# Patient Record
Sex: FEMALE | Race: WHITE
[De-identification: names, ages, dates, MRNs, and addresses within clinical notes are randomized per-mention and may not be internally consistent; named-entity substitution may affect disease eponyms.]

---

## 2017-01-08 ENCOUNTER — HOSPITAL ENCOUNTER (OUTPATIENT)
Dept: HOSPITAL 44 - ED | Age: 67
Setting detail: OBSERVATION
LOS: 1 days | Discharge: HOME | End: 2017-01-09
Attending: FAMILY MEDICINE | Admitting: FAMILY MEDICINE
Payer: COMMERCIAL

## 2017-01-08 VITALS — BODY MASS INDEX: 40.8 KG/M2

## 2017-01-08 DIAGNOSIS — J11.1: Primary | ICD-10-CM

## 2017-01-08 DIAGNOSIS — E86.0: ICD-10-CM

## 2017-01-08 LAB
EGFR (AFRICAN): > 60
EGFR (NON-AFRICAN): > 60
MCH RBC QN AUTO: 31 PG (ref 28–34)

## 2017-01-08 PROCEDURE — 96374 THER/PROPH/DIAG INJ IV PUSH: CPT

## 2017-01-08 PROCEDURE — 93005 ELECTROCARDIOGRAM TRACING: CPT

## 2017-01-08 PROCEDURE — 99284 EMERGENCY DEPT VISIT MOD MDM: CPT

## 2017-01-08 PROCEDURE — 80053 COMPREHEN METABOLIC PANEL: CPT

## 2017-01-08 PROCEDURE — 36415 COLL VENOUS BLD VENIPUNCTURE: CPT

## 2017-01-08 PROCEDURE — S1016 NON-PVC INTRAVENOUS ADMINIST: HCPCS

## 2017-01-08 PROCEDURE — 96376 TX/PRO/DX INJ SAME DRUG ADON: CPT

## 2017-01-08 PROCEDURE — 71020: CPT

## 2017-01-08 PROCEDURE — 81002 URINALYSIS NONAUTO W/O SCOPE: CPT

## 2017-01-08 PROCEDURE — 87400 INFLUENZA A/B EACH AG IA: CPT

## 2017-01-08 PROCEDURE — 96361 HYDRATE IV INFUSION ADD-ON: CPT

## 2017-01-08 PROCEDURE — 85025 COMPLETE CBC W/AUTO DIFF WBC: CPT

## 2017-01-08 PROCEDURE — G0378 HOSPITAL OBSERVATION PER HR: HCPCS

## 2017-01-08 PROCEDURE — 87040 BLOOD CULTURE FOR BACTERIA: CPT

## 2017-01-08 PROCEDURE — 80048 BASIC METABOLIC PNL TOTAL CA: CPT

## 2017-01-08 RX ADMIN — CITALOPRAM SCH MG: 20 TABLET ORAL at 21:03

## 2017-01-08 RX ADMIN — ACETAMINOPHEN SCH MG: 325 TABLET, FILM COATED ORAL at 21:01

## 2017-01-08 RX ADMIN — AMITRIPTYLINE HYDROCHLORIDE SCH MG: 25 TABLET, FILM COATED ORAL at 21:03

## 2017-01-08 RX ADMIN — OSELTAMIVIR PHOSPHATE SCH MG: 75 CAPSULE ORAL at 21:03

## 2017-01-08 RX ADMIN — ACETAMINOPHEN SCH MG: 325 TABLET, FILM COATED ORAL at 18:37

## 2017-01-08 RX ADMIN — RETINOL, ERGOCALCIFEROL, .ALPHA.-TOCOPHEROL ACETATE, DL-, PHYTONADIONE, ASCORBIC ACID, NIACINAMIDE, RIBOFLAVIN 5-PHOSPHATE SODIUM, THIAMINE HYDROCHLORIDE, PYRIDOXINE HYDROCHLORIDE, DEXPANTHENOL, BIOTIN, FOLIC ACID, AND CYANOCOBALAMIN SCH MLS/HR: KIT at 13:55

## 2017-01-08 RX ADMIN — ALPRAZOLAM SCH MG: 0.5 TABLET ORAL at 21:03

## 2017-01-08 NOTE — ED PHYSICIAN DOCUMENTATION
GI Bleed





- HISTORIAN


Historian: patient, paramedics





- HPI


Chief Complaint: General Adult


Additional Information: 





n/e/d fever chills--w.no other family membersorks nh(flu).  pt has had flu and 

pneumovac


Onset: hours (0200)


Timing: sudden onset


Severity: moderate





- Associated Symptoms


Description of Stools: diarrhea


Abdominal Pain: cramping


Emesis Description: denies: blood, coffee grounds


Other Related Symptoms: nausea, vomiting





- ROS


CONST: other (exab of chronic lo back pain)


SKIN/LYMPH: denies: leg swelling, rash, swollen glands, ankle swelling


CVS/RESP: cough.  denies: shortness of breath


EYES/ENT: denies: problems with vision, sore throat





- PAST HX


Past History: other (ch lo back pain  htn   pacemaker-no mi)


Surgeries/Procedures: appendectomy, cholecystectomy, hysterectomy


Immunizations: influenza, pneumovax


Allergies/Adverse Reactions: 


 Allergies











Allergy/AdvReac Type Severity Reaction Status Date / Time


 


No Known Allergies Allergy   Unverified 01/08/17 11:52











Home Medications: 


 Ambulatory Orders











 Medication  Instructions  Recorded


 


Alprazolam [XANAX] 0.25 mg PO HS 01/08/17


 


Amitriptyline HCl 50 mg PO DAILY 01/08/17


 


Atenolol [Atenolol] 25 mg PO DAILY 01/08/17


 


Atorvastatin Calcium 20 mg PO DAILY 01/08/17


 


Citalopram Hydrobromide 20 mg PO DAILY 01/08/17





[Citalopram HBr]  


 


Glucosamine HCl/Chondr Venegas A Na [Pv 1 each PO DAILY 01/08/17





Glucosamine-Chondroit Cplt]  


 


Meloxicam [Mobic] 15 mg PO DAILY 01/08/17


 


Omeprazole [Omeprazole] 20 mg PO DAILY 01/08/17


 


Triamterene/Hydrochlorothiazid 1 each PO DAILY 01/08/17





[Dyazide 37.5-25 Capsule]  














- SOCIAL HX


Smoking History: non-smoker


Alcohol Use: none


Drug Use: none





- FAMILY HX


Family History: none





- VITAL SIGNS


Vital Signs: 


 Vital Signs











Temp Pulse Resp BP Pulse Ox


 


 99.3 F   105 H  20   128/78   94 


 


 01/08/17 11:20  01/08/17 11:42  01/08/17 11:20  01/08/17 11:20  01/08/17 11:42














- REVIEWED ASSESSMENTS


Nursing Assessment  Reviewed: Yes


Vitals Reviewed: Yes





ED Results Lab/Radiology





- Lab Results


Lab Results: 


 Lab Results











  01/08/17 01/08/17





  11:42 11:42


 


WBC    19.40 K/ul H K/ul





    (4.00-12.00) 


 


RBC    5.55 M/ul H M/ul





    (3.90-5.20) 


 


Hgb    17.2 g/dL H g/dL





    (12.0-16.0) 


 


Hct    52.5 % H %





    (34.5-46.5) 


 


MCV    94.7 fl fl





    (80.0-100.0) 


 


MCH    31.0 pg pg





    (28.0-34.0) 


 


MCHC    32.7 g/dL g/dL





    (30.0-36.0) 


 


RDW    12.3 % %





    (11.3-14.3) 


 


Plt Count    293 K/mm3 K/mm3





    (130-400) 


 


Sodium  142 mmol/L mmol/L  





   (136-145)  


 


Potassium  3.3 mmol/L L mmol/L  





   (3.5-5.0)  


 


Chloride  102 mmol/L mmol/L  





   ()  


 


Carbon Dioxide  28 mmol/L mmol/L  





   (20-32)  


 


BUN  28 mg/dL H mg/dL  





   (10-26)  


 


Creatinine  0.7 mg/dL mg/dL  





   (0.4-1.5)  


 


Estimated Creat Clear  210   





   


 


Est GFR ( Amer)  > 60   





  (60 - ) 


 


Est GFR (Non-Af Amer)  > 60   





  (60 - ) 


 


Glucose  105 mg/dL H mg/dL  





   (70-99)  


 


Calcium  10.0 mg/dL mg/dL  





   (8.5-10.5)  


 


Total Bilirubin  0.9 mg/dL mg/dL  





   (0.2-1.2)  


 


AST  30 U/L U/L  





   (0-41)  


 


ALT  27 U/L U/L  





   (0-45)  


 


Alkaline Phosphatase  140 U/L H U/L  





   ()  


 


Total Protein  8.2 g/dL g/dL  





   (6.0-8.5)  


 


Albumin  4.7 g/dL g/dL  





   (3.0-5.5)  














- Orders


Orders: 


 ED Orders











 Category Date Time Status


 


 Continuous EKG monitoring Q30M Care  01/08/17 11:42 Active


 


 Continuous Pulse Oximetry Q30M Care  01/08/17 11:42 Active


 


 Place Saline Lock/IV NOW Care  01/08/17 11:42 Active


 


 BLOOD CULTURE Stat Lab  01/08/17 12:07 Received


 


 CBC/PLATELET/DIFF Routine Lab  01/08/17 11:42 Completed


 


 CMP Routine Lab  01/08/17 11:42 Completed


 


 INFLUENZA A&B Stat Lab  01/08/17 11:42 Ordered


 


 Ondansetron HCl/Pf [Zofran 4 mg/2 ml] Med  01/08/17 12:04 Discontinued





 4 mg .ROUTE .STK-MED ONE   


 


 Ondansetron HCl/Pf [Zofran 4 mg/2 ml] Med  01/08/17 12:04 Discontinued





 4 mg IVP NOW ONE   


 


 EKG WITH COMPARISON Stat Ther  01/08/17 11:42 Ordered


 


 Transfer Routine Transfer  01/08/17 Ordered














Abdominal Pain Physical Exam





- Physical Exam


General Appearance: moderate distress


EENT: eye inspection normal


NECK: normal inspection, supple


RESPIRATORY: no resp distress, breath sounds normal


CVS: No: reg rate & rhythm (tachy-paced at 105--pt says set at 84bpm)


ABDOMEN: soft, non-tender


EXTREMITIES: non-tender, normal range of motion


NEURO: oriented X3, mood/affect nml (chilling w/temp 99)


Vital Signs: 


 Vital Signs











Temp Pulse Resp BP Pulse Ox


 


 99.3 F   105 H  20   128/78   94 


 


 01/08/17 11:20  01/08/17 11:42  01/08/17 11:20  01/08/17 11:20  01/08/17 11:42














Discharge


Clincal Impression: 


 Influenza A





Home Medications: 


Ambulatory Orders





Alprazolam [XANAX] 0.25 mg PO HS 01/08/17 


Amitriptyline HCl 50 mg PO DAILY 01/08/17 


Atenolol [Atenolol] 25 mg PO DAILY 01/08/17 


Atorvastatin Calcium 20 mg PO DAILY 01/08/17 


Citalopram Hydrobromide [Citalopram HBr] 20 mg PO DAILY 01/08/17 


Glucosamine HCl/Chondr Venegas A Na [Pv Glucosamine-Chondroit Cplt] 1 each PO DAILY 

01/08/17 


Meloxicam [Mobic] 15 mg PO DAILY 01/08/17 


Omeprazole [Omeprazole] 20 mg PO DAILY 01/08/17 


Triamterene/Hydrochlorothiazid [Dyazide 37.5-25 Capsule] 1 each PO DAILY 01/08/ 17 








Condition: Good


Disposition: 09 ADMITTED AS INPATIENT


Decision to Admit: [id number]


Decision Time: 12:36

## 2017-01-09 VITALS — SYSTOLIC BLOOD PRESSURE: 140 MMHG | DIASTOLIC BLOOD PRESSURE: 50 MMHG

## 2017-01-09 LAB
APPEARANCE UR: CLEAR
BASOPHILS NFR BLD: 0.2 % (ref 0–1.5)
COLOR,URINE: YELLOW
EGFR (AFRICAN): > 60
EGFR (NON-AFRICAN): > 60
EOSINOPHIL NFR BLD: 1.4 % (ref 0–6.8)
LYMPHOCYTES # BLD AUTO: 1.4 # K/UL (ref 0.6–4)
MCH RBC QN AUTO: 31.2 PG (ref 28–34)
MONOCYTES #: 0.5 # K/UL (ref 0–0.9)
MONOCYTES %: 6.5 % (ref 0–11)
NEUTROPHILS #: 5.6 # K/UL (ref 1.4–7.7)
PH UR STRIP: 5 [PH] (ref 5–8)
PH UR STRIP: 6.5 [PH] (ref 5–8)
RBC UR QL: NEGATIVE
UROBILINOGEN URINE: 0.2 EU (ref 0.2–1)
UROBILINOGEN URINE: 0.2 EU (ref 0.2–1)

## 2017-01-09 RX ADMIN — ACETAMINOPHEN SCH MG: 325 TABLET, FILM COATED ORAL at 08:56

## 2017-01-09 RX ADMIN — AMITRIPTYLINE HYDROCHLORIDE SCH MG: 25 TABLET, FILM COATED ORAL at 08:56

## 2017-01-09 RX ADMIN — ACETAMINOPHEN SCH MG: 325 TABLET, FILM COATED ORAL at 12:38

## 2017-01-09 RX ADMIN — RETINOL, ERGOCALCIFEROL, .ALPHA.-TOCOPHEROL ACETATE, DL-, PHYTONADIONE, ASCORBIC ACID, NIACINAMIDE, RIBOFLAVIN 5-PHOSPHATE SODIUM, THIAMINE HYDROCHLORIDE, PYRIDOXINE HYDROCHLORIDE, DEXPANTHENOL, BIOTIN, FOLIC ACID, AND CYANOCOBALAMIN SCH MLS/HR: KIT at 02:26

## 2017-01-09 RX ADMIN — ALPRAZOLAM SCH: 0.5 TABLET ORAL at 09:08

## 2017-01-09 RX ADMIN — RETINOL, ERGOCALCIFEROL, .ALPHA.-TOCOPHEROL ACETATE, DL-, PHYTONADIONE, ASCORBIC ACID, NIACINAMIDE, RIBOFLAVIN 5-PHOSPHATE SODIUM, THIAMINE HYDROCHLORIDE, PYRIDOXINE HYDROCHLORIDE, DEXPANTHENOL, BIOTIN, FOLIC ACID, AND CYANOCOBALAMIN SCH MLS/HR: KIT at 08:57

## 2017-01-09 RX ADMIN — ACETAMINOPHEN SCH MG: 325 TABLET, FILM COATED ORAL at 05:15

## 2017-01-09 RX ADMIN — OSELTAMIVIR PHOSPHATE SCH MG: 75 CAPSULE ORAL at 08:56

## 2017-01-09 RX ADMIN — CITALOPRAM SCH MG: 20 TABLET ORAL at 08:56

## 2017-01-09 RX ADMIN — ACETAMINOPHEN SCH MG: 325 TABLET, FILM COATED ORAL at 00:44

## 2017-01-09 NOTE — DIAGNOSTIC IMAGING REPORT
Report Submission Date: 2017 8:16:11 PM CST







Patient ~ Study  

 

Name: LIZETTE PEREZ ~ Date: 2017 8:03:06 PM CST

 

MRN: [id number] ~ Modality Type: CR

 

Gender: F ~ Description: CHEST

 

: 48 ~ Institution: [facility]

 

Physician: SY PRITCHARD DO  ~ ~ ~





~

Chest, PA and lateral 



History: Cough, fever 



Findings: Left subclavian dual lead transvenous pacemaker is present. There is 
no infiltrate, effusion or pneumothorax. Heart size, mediastinum and pulmonary 
vascularity are normal. 



Impression: No active disease.

~

Electronically signed on 2017 8:16:11 PM CST by:

Chang FERNANDO

## 2017-03-03 NOTE — DISCHARGE SUMMARY
Discharge Summary





- Discharge Sumary


History of Present Illness: 





dehydration hypokalemia from influenza A


Condition at Discharge: Stable


Home Medications: 


 Ambulatory Orders











 Medication  Instructions  Recorded


 


Alprazolam [XANAX] 0.25 mg PO HS 01/08/17


 


Amitriptyline HCl 50 mg PO DAILY 01/08/17


 


Atenolol [Atenolol] 25 mg PO DAILY 01/08/17


 


Atorvastatin Calcium 20 mg PO DAILY 01/08/17


 


Citalopram Hydrobromide 20 mg PO DAILY 01/08/17





[Citalopram HBr]  


 


Glucosamine HCl/Chondr Venegas A Na [Pv 1 each PO DAILY 01/08/17





Glucosamine-Chondroit Cplt]  


 


Meloxicam [Mobic] 15 mg PO DAILY 01/08/17


 


Omeprazole [Omeprazole] 20 mg PO DAILY 01/08/17


 


Triamterene/Hydrochlorothiazid 1 each PO DAILY 01/08/17





[Dyazide 37.5-25 Capsule]  


 


Potassium Chloride [Klor-Con 10] 20 meq PO BID #60 tablet.er 01/09/17











Consultations this Visit: None


Procedures this Visit: None


Allergies/Adverse Reactions: 


 Allergies











Allergy/AdvReac Type Severity Reaction Status Date / Time


 


No Known Allergies Allergy   Unverified 01/08/17 11:52











Discharge Summary: 





DISCHARGED TO HOME F/U W/PCP


Hospital Course: SATISFACTORY-IMPROVED

## 2017-08-28 ENCOUNTER — HOSPITAL ENCOUNTER (EMERGENCY)
Dept: HOSPITAL 44 - ED | Age: 67
Discharge: HOME | End: 2017-08-28
Payer: COMMERCIAL

## 2017-08-28 VITALS — DIASTOLIC BLOOD PRESSURE: 60 MMHG | SYSTOLIC BLOOD PRESSURE: 146 MMHG

## 2017-08-28 DIAGNOSIS — J02.0: Primary | ICD-10-CM

## 2017-08-28 DIAGNOSIS — E87.6: ICD-10-CM

## 2017-08-28 DIAGNOSIS — M62.838: ICD-10-CM

## 2017-08-28 LAB
BASOPHILS NFR BLD: 1 % (ref 0–1.5)
EGFR (AFRICAN): > 60
EGFR (NON-AFRICAN): > 60
EOSINOPHIL NFR BLD: 5.7 % (ref 0–6.8)
MCH RBC QN AUTO: 30.7 PG (ref 28–34)
MCV RBC AUTO: 87.9 FL (ref 80–100)
MONOCYTES %: 7.1 % (ref 0–11)
NEUTROPHILS #: 4.9 # K/UL (ref 1.4–7.7)

## 2017-08-28 PROCEDURE — 96372 THER/PROPH/DIAG INJ SC/IM: CPT

## 2017-08-28 PROCEDURE — 71020: CPT

## 2017-08-28 PROCEDURE — 36415 COLL VENOUS BLD VENIPUNCTURE: CPT

## 2017-08-28 PROCEDURE — 84484 ASSAY OF TROPONIN QUANT: CPT

## 2017-08-28 PROCEDURE — 93005 ELECTROCARDIOGRAM TRACING: CPT

## 2017-08-28 PROCEDURE — 80053 COMPREHEN METABOLIC PANEL: CPT

## 2017-08-28 PROCEDURE — S1016 NON-PVC INTRAVENOUS ADMINIST: HCPCS

## 2017-08-28 PROCEDURE — A9270 NON-COVERED ITEM OR SERVICE: HCPCS

## 2017-08-28 PROCEDURE — 85025 COMPLETE CBC W/AUTO DIFF WBC: CPT

## 2017-08-28 PROCEDURE — 99283 EMERGENCY DEPT VISIT LOW MDM: CPT

## 2017-08-28 PROCEDURE — 87880 STREP A ASSAY W/OPTIC: CPT

## 2017-08-28 PROCEDURE — 83880 ASSAY OF NATRIURETIC PEPTIDE: CPT

## 2017-08-28 RX ADMIN — ORPHENADRINE CITRATE ONE MG: 30 INJECTION, SOLUTION INTRAMUSCULAR; INTRAVENOUS at 12:04

## 2017-08-28 RX ADMIN — POTASSIUM CHLORIDE ONE MEQ: 1500 TABLET, EXTENDED RELEASE ORAL at 12:30

## 2017-08-28 RX ADMIN — KETOROLAC TROMETHAMINE ONE MG: 30 INJECTION, SOLUTION INTRAMUSCULAR at 12:04

## 2017-08-28 NOTE — ED PHYSICIAN DOCUMENTATION
General Adult





- HISTORIAN


Historian: patient





- HPI


Stated Complaint: neck pain


Chief Complaint: General Adult


Onset: hours


Timing: worse


Further Comments: yes (67 year old female patient presents with complaints of 

left and right lateral neck pain, sore throat, cough, "hard to get air in" and 

right scapula pain.  Patient is status post right rotator cuff repair on 8/4/ 2017.  Right arm immobilized.)





- ROS


CONST: recent illness (Right shoulder surgery 8/4/17)


EYES/ENT: denies: problems with vision, nasal drainage, nasal congestion


CVS/RESP: shortness of breath, cough.  denies: chest pain


GI/: none


MS/SKIN/LYMPH: none


NEURO/PSYCH: denies: headache, fainting, dizziness, tingling, numbness, 

difficulty walking, difficulty with speech, anxiety, depression, other





- PAST HX


Past History: hypertension


Other History: other (HLD, anxiety, GERD, RA)


Surgeries/Procedures: cholecystectomy, other (appendectomy, hysterectomy, 

pacemaker)


Allergies/Adverse Reactions: 


 Allergies











Allergy/AdvReac Type Severity Reaction Status Date / Time


 


No Known Allergies Allergy   Verified 08/28/17 12:11














Home Medications: 


 Ambulatory Orders











 Medication  Instructions  Recorded


 


Alprazolam [XANAX] 0.25 mg PO HS 01/08/17


 


Amitriptyline HCl 50 mg PO DAILY 01/08/17


 


Atenolol [Atenolol] 25 mg PO DAILY 01/08/17


 


Citalopram Hydrobromide 20 mg PO DAILY 01/08/17





[Citalopram HBr]  


 


Glucosamine HCl/Chondr Venegas A Na [Pv 1 each PO DAILY 01/08/17





Glucosamine-Chondroit Cplt]  


 


Meloxicam [Mobic] 15 mg PO DAILY 01/08/17


 


Omeprazole [Omeprazole] 20 mg PO DAILY 01/08/17


 


Triamterene/Hydrochlorothiazid 1 each PO DAILY 01/08/17





[Dyazide 37.5-25 Capsule]  


 


Alprazolam [Xanax] 0.5 mg PO DAILY 08/28/17


 


Amitriptyline HCl [Elavil] 50 mg PO DAILY 08/28/17


 


Atenolol [Tenormin] 25 mg PO DAILY 08/28/17














- SOCIAL HX


Smoking History: non-smoker





- FAMILY HX


Family History: No





- VITAL SIGNS


Vital Signs: 





 Vital Signs











Temp Pulse Resp BP Pulse Ox


 


 97.8 F   79   22   124/54   98 


 


 08/28/17 10:45  08/28/17 10:45  08/28/17 10:45  08/28/17 10:45  08/28/17 10:45














- REVIEWED ASSESSMENTS


Nursing Assessment  Reviewed: Yes


Vitals Reviewed: Yes





ED Results Lab/Radiology





- Lab Results


Lab Results: 





 Lab Results











  08/28/17 08/28/17 08/28/17





  11:15 11:15 11:15


 


WBC      8.20 K/ul K/ul





     (4.00-12.00) 


 


RBC      4.11 M/ul M/ul





     (3.90-5.20) 


 


Hgb      12.6 g/dL g/dL





     (12.0-16.0) 


 


Hct      36.1 % %





     (34.5-46.5) 


 


MCV      87.9 fl fl





     (80.0-100.0) 


 


MCH      30.7 pg pg





     (28.0-34.0) 


 


MCHC      34.9 g/dL g/dL





     (30.0-36.0) 


 


RDW      12.3 % %





     (11.3-14.3) 


 


Plt Count      442 K/mm3 H K/mm3





     (130-400) 


 


Neut % (Auto)      59.4 % %





     (39.0-79.0) 


 


Lymph % (Auto)      25.3 % %





     (16.0-50.0) 


 


Mono % (Auto)      7.1 % %





     (0.0-11.0) 


 


Eos % (Auto)      5.7 % %





     (0.0-6.8) 


 


Baso % (Auto)      1.0 





     (0.0-1.5) 


 


Neut # (Auto)      4.9 # k/uL # k/uL





     (1.4-7.7) 


 


Lymph # (Auto)      2.1 # k/uL # k/uL





     (0.6-4.0) 


 


Mono # (Auto)      0.6 # k/uL # k/uL





     (0.0-0.9) 


 


Eos # (Auto)      0.5 # k/uL # k/uL





     (0.0-0.6) 


 


Baso # (Auto)      0.1 # k/uL # k/uL





     (0.0-0.5) 


 


Reactive Lymphs %      1.5 % %





     (0.0-5.0) 


 


Reactive Lymphs #      0.1 # k/uL # k/uL





     (0.0-0.8) 


 


Sodium    139 mmol/L mmol/L  





    (136-145)  


 


Potassium    3.2 mmol/L L mmol/L  





    (3.5-5.0)  


 


Chloride    98 mmol/L mmol/L  





    ()  


 


Carbon Dioxide    32 mmol/L mmol/L  





    (20-32)  


 


BUN    16 mg/dL mg/dL  





    (10-26)  


 


Creatinine    0.7 mg/dL mg/dL  





    (0.4-1.5)  


 


Est GFR ( Amer)    > 60   





   (60 - ) 


 


Est GFR (Non-Af Amer)    > 60   





   (60 - ) 


 


Glucose    77 mg/dL mg/dL  





    (70-99)  


 


Calcium    9.5 mg/dL mg/dL  





    (8.5-10.5)  


 


Total Bilirubin    0.4 mg/dL mg/dL  





    (0.2-1.2)  


 


AST    25 U/L U/L  





    (0-41)  


 


ALT    21 U/L U/L  





    (0-45)  


 


Alkaline Phosphatase    134 U/L H U/L  





    ()  


 


Troponin I  < 0.03 ng/mL L ng/mL    





   (0.03-0.06)   


 


NT-Pro-B Natriuret Pep  323.5 pg/mL H pg/mL    





   (15.0-125.0)   


 


Total Protein    7.2 g/dL g/dL  





    (6.0-8.5)  


 


Albumin    4.2 g/dL g/dL  





    (3.0-5.5)  


 


Group A Strep Screen      





    














  08/28/17





  11:15


 


WBC  





  


 


RBC  





  


 


Hgb  





  


 


Hct  





  


 


MCV  





  


 


MCH  





  


 


MCHC  





  


 


RDW  





  


 


Plt Count  





  


 


Neut % (Auto)  





  


 


Lymph % (Auto)  





  


 


Mono % (Auto)  





  


 


Eos % (Auto)  





  


 


Baso % (Auto)  





  


 


Neut # (Auto)  





  


 


Lymph # (Auto)  





  


 


Mono # (Auto)  





  


 


Eos # (Auto)  





  


 


Baso # (Auto)  





  


 


Reactive Lymphs %  





  


 


Reactive Lymphs #  





  


 


Sodium  





  


 


Potassium  





  


 


Chloride  





  


 


Carbon Dioxide  





  


 


BUN  





  


 


Creatinine  





  


 


Est GFR ( Amer)  





  


 


Est GFR (Non-Af Amer)  





  


 


Glucose  





  


 


Calcium  





  


 


Total Bilirubin  





  


 


AST  





  


 


ALT  





  


 


Alkaline Phosphatase  





  


 


Troponin I  





  


 


NT-Pro-B Natriuret Pep  





  


 


Total Protein  





  


 


Albumin  





  


 


Group A Strep Screen  Positive  H 





   (NEGATIVE) 














- Orders


Orders: 





 ED Orders











 Category Date Time Status


 


 Place IV Lock 1T Care  08/28/17 11:06 Inactive


 


 CHEST P.A.&LAT 2 VIEWS [RAD] Stat Exams  08/28/17 Ordered


 


 BNP [NT-proBNP] Stat Lab  08/28/17 11:15 Completed


 


 CBC/PLATELET/DIFF Stat Lab  08/28/17 11:15 Completed


 


 CMP Stat Lab  08/28/17 11:15 Completed


 


 GRP A STREP SCREEN Stat Lab  08/28/17 11:15 Completed


 


 TROPONIN I (cTnI) Stat Lab  08/28/17 11:15 Completed


 


 Ketorolac Tromethamine [Toradol] Med  08/28/17 10:58 Discontinued





 60 mg IM NOW ONE   


 


 Orphenadrine Citrate [Norflex] Med  08/28/17 11:22 Discontinued





 60 mg IM NOW ONE   


 


 Penicillin G Benzathine [Bicillin l-A] Med  08/28/17 12:17 Once





 1,200,000 units IM NOW ONE   


 


 Potassium Chloride [Klor-Con M20] Med  08/28/17 12:09 Discontinued





 20 meq PO NOW ONE   


 


 EKG WITH COMPARISON Stat Ther  08/28/17 11:07 Ordered














General Adult Physical Exam





- PHYSICAL EXAM


GENERAL APPEARANCE: anxious


EENT: eye inspection normal, ENT inspection normal, pharynx normal, no signs of 

dehydration, GLORIA, no nystagmus, TM's nml


NECK: normal inspection, other (left and right lateral neck tenderness with 

palpation)


RESPIRATORY: no resp distress, chest non-tender, breath sounds normal


CVS: reg rate & rhythm, heart sounds normal, equal pulses, no murmur, no gallop

, PMI nml, no JVD, no friction rub, 24


ABDOMEN: soft, no organomegaly, normal bowel sounds, no abdominal bruit, no 

distension


BACK: normal inspection, no CVA tenderness


SKIN: normal color, warm/dry, NR, INT, PAL, DR


EXTREMITIES: non-tender, normal range of motion, no evidence of injury, no edema

, other (right arm immobilzer in place.)


NEURO: oriented X3, CN's nml as tested, motor nml, sensation nml, mood/affect 

nml





Discharge


Clincal Impression: 


 Strep pharyngitis, Cervical paraspinal muscle spasm, Hypokalemia due to loss 

of potassium, History of repair of right rotator cuff





Referrals: 


Primary Doctor,No [Primary Care Provider] - 2 Days


Additional Instructions: 


Diagnosis #1:


Chloraseptic spray or lozenges as needed for throat pain.  


Warm salt water gargles as needed pain 


Increase your fluid intake  juices, hot tea, non-caffeinated beverages


If you are congested - You may want to try Vicks rub on your chest and/or feet


Use a humidifier in the room where you sleep.  You can also sit in a steam 

filled bathroom 1-2 times a day.  


Tylenol or Ibuprofen as needed for fever, pain and body aches.





Diagnosis #2:


Muscle spasms


Ice


Rest


Muscle relaxants as needed for spasm


Ibuprofen 600-800mg three times a day with food as needed.  Do not take for 

more than 5 days in a row. 


You may want to try massage, over the counter lidocaine patches, biofreeze, mahesh 

gillespie or aspercream .


Home Medications: 


Ambulatory Orders





Alprazolam [XANAX] 0.25 mg PO HS 01/08/17 


Amitriptyline HCl 50 mg PO DAILY 01/08/17 


Atenolol [Atenolol] 25 mg PO DAILY 01/08/17 


Citalopram Hydrobromide [Citalopram HBr] 20 mg PO DAILY 01/08/17 


Glucosamine HCl/Chondr Venegas A Na [Pv Glucosamine-Chondroit Cplt] 1 each PO DAILY 

01/08/17 


Meloxicam [Mobic] 15 mg PO DAILY 01/08/17 


Omeprazole [Omeprazole] 20 mg PO DAILY 01/08/17 


Triamterene/Hydrochlorothiazid [Dyazide 37.5-25 Capsule] 1 each PO DAILY 01/08/ 17 


Alprazolam [Xanax] 0.5 mg PO DAILY 08/28/17 


Amitriptyline HCl [Elavil] 50 mg PO DAILY 08/28/17 


Atenolol [Tenormin] 25 mg PO DAILY 08/28/17 








Condition: Stable


Disposition: 01 HOME, SELF-CARE


Decision to Admit: NO


Decision Time: 12:24

## 2017-08-28 NOTE — DIAGNOSTIC IMAGING REPORT
CONCHITA LEGER ([facility]) - ER 

[facility]

[address] [address] [address]

 

 

 

 

Report Submission Date: Aug 28, 2017 11:42:16 AM CDT

Patient       Study

Name:   LIZETTE PEREZ       Date:   Aug 28, 2017 11:10:42 AM CDT

MRN:   [id number]       Modality Type:   CR

Gender:   F       Description:   CHEST

:   48       Institution:   [facility]

Physician:   CONCHITA LEGER ([facility]) - ER

         

 

 

Chest 2 views 



History: Dyspnea 



Findings: Obesity, low lung volumes, cardiomegaly, dual lead transvenous 
pacemaker, and old granulomatous disease are observed. Minimal bibasilar 
reticular opacity is present. There is no pleural effusion. Findings have not 
change since the 2017 exam. 



Findings: 



1. Bibasilar atelectasis, edema, or bronchitis without change. 



2. Obesity, low lung volumes, cardiomegaly, and pacemaker without change.

 

Electronically signed on Aug 28, 2017 11:42:16 AM CDT by:

Saleem FERNANDO

## 2017-08-31 ENCOUNTER — HOSPITAL ENCOUNTER (EMERGENCY)
Dept: HOSPITAL 44 - ED | Age: 67
Discharge: HOME | End: 2017-08-31
Payer: COMMERCIAL

## 2017-08-31 VITALS — SYSTOLIC BLOOD PRESSURE: 115 MMHG | DIASTOLIC BLOOD PRESSURE: 68 MMHG

## 2017-08-31 DIAGNOSIS — J02.9: ICD-10-CM

## 2017-08-31 DIAGNOSIS — F41.9: Primary | ICD-10-CM

## 2017-08-31 LAB
BASOPHILS NFR BLD: 0.6 % (ref 0–1.5)
EGFR (AFRICAN): > 60
EGFR (NON-AFRICAN): > 60
EOSINOPHIL NFR BLD: 4 % (ref 0–6.8)
MCH RBC QN AUTO: 30.7 PG (ref 28–34)
MCV RBC AUTO: 89.6 FL (ref 80–100)
MONOCYTES %: 7.9 % (ref 0–11)
NEUTROPHILS #: 5.6 # K/UL (ref 1.4–7.7)

## 2017-08-31 PROCEDURE — 99283 EMERGENCY DEPT VISIT LOW MDM: CPT

## 2017-08-31 PROCEDURE — 96374 THER/PROPH/DIAG INJ IV PUSH: CPT

## 2017-08-31 PROCEDURE — 84484 ASSAY OF TROPONIN QUANT: CPT

## 2017-08-31 PROCEDURE — 85025 COMPLETE CBC W/AUTO DIFF WBC: CPT

## 2017-08-31 PROCEDURE — 82553 CREATINE MB FRACTION: CPT

## 2017-08-31 PROCEDURE — 82550 ASSAY OF CK (CPK): CPT

## 2017-08-31 PROCEDURE — 80053 COMPREHEN METABOLIC PANEL: CPT

## 2017-08-31 PROCEDURE — 71010: CPT

## 2017-08-31 PROCEDURE — S1016 NON-PVC INTRAVENOUS ADMINIST: HCPCS

## 2017-08-31 NOTE — DIAGNOSTIC IMAGING REPORT
MUKUL ATYLOR 

[facility]

[address] [facility] [address] [address]

 

 

 

 

Report Submission Date: Aug 31, 2017 7:14:54 PM CDT

Patient       Study

Name:   LIZETTE PEREZ       Date:   Aug 31, 2017 6:57:49 PM CDT

MRN:   [id number]       Modality Type:   CR

Gender:   F       Description:   CHEST

:   48       Institution:   [facility]

Physician:   MUKUL TAYLOR

         

 

 

Examination: Portable chest 



History: Chest discomfort 



Comparison exam: 2017 



Findings: Single view of the chest demonstrates a decreased inspiratory effort. 
Cardiac silhouette not enlarged. Tortuosity of the thoracic aorta. Left-sided 
cardiac pacemaker. Lung fields without focal infiltrate. No effusion. Osseous 
structures are appropriate for age. 



Impression: Poor inspiratory effort. No acute pulmonary process. No effusion.

 

Electronically signed on Aug 31, 2017 7:14:54 PM CDT by:

Sid FERNANDO

## 2017-08-31 NOTE — ED PHYSICIAN DOCUMENTATION
General Adult





- HISTORIAN


Historian: patient





- HPI


Stated Complaint: sob, neck pain, sore throat


Chief Complaint: General Adult


Onset: days ago (4)


Timing: still present


Severity: moderate


Further Comments: yes (Pt is a 68 yo female seen here 8/28/17 with similar 

presentation.  Pt c/o sob, neck pain, and sore throat.  Pt was dx'd on 8/28 

with strep pharyngitis, cervical paraspinal muscle spasm, and hypokalemia.  Pt 

had a rotator cuff repair on 8/4/17 and wears a shoulder immobilizer.  Pt c/o 

sob, "hard to get air in" and neck pain.  Pt states that she is very dry and 

has a raw sore throat.  Pt was given Penicillin IM in the ER on 8/28, but says 

that she was not sent home with oral abx.  Pt has been wheezy.)





- ROS


CONST: weakness


EYES/ENT: sore throat, other (dry mouth)


CVS/RESP: shortness of breath, other (wheezing)


GI/: none


MS/SKIN/LYMPH: none





- PAST HX


Past History: hypertension, other (HLD, anxiety, GERD, RA)


Surgeries/Procedures: cholecystectomy, other (appendectomy, hysterectomy, 

pacemaker.)





- SOCIAL HX


Smoking History: non-smoker





- FAMILY HX


Family History: No





- VITAL SIGNS


Vital Signs: 





 Vital Signs











Temp Pulse Resp BP Pulse Ox


 


          146/60    


 


          08/28/17 12:44   














- REVIEWED ASSESSMENTS


Nursing Assessment  Reviewed: Yes


Vitals Reviewed: Yes





<Hany Salazar - Last Filed: 08/31/17 19:35>





- VITAL SIGNS


Vital Signs: 





 Vital Signs











Temp Pulse Resp BP Pulse Ox


 


 98.0 F   77   16   140/55   99 


 


 08/31/17 18:30  08/31/17 18:30  08/31/17 18:30  08/31/17 18:30  08/31/17 18:30














<SHARLA DOSHI - Last Filed: 08/31/17 20:12>





- PAST HX


Allergies/Adverse Reactions: 


 Allergies











Allergy/AdvReac Type Severity Reaction Status Date / Time


 


No Known Allergies Allergy   Verified 08/31/17 18:40














Home Medications: 


 Ambulatory Orders











 Medication  Instructions  Recorded


 


Alprazolam [XANAX] 0.25 mg PO HS 01/08/17


 


Citalopram Hydrobromide 20 mg PO DAILY 01/08/17





[Citalopram HBr]  


 


Glucosamine HCl/Chondr Venegas A Na [Pv 1 each PO DAILY 01/08/17





Glucosamine-Chondroit Cplt]  


 


Meloxicam [Mobic] 15 mg PO DAILY 01/08/17


 


Omeprazole [Omeprazole] 20 mg PO DAILY 01/08/17


 


Triamterene/Hydrochlorothiazid 1 each PO DAILY 01/08/17





[Dyazide 37.5-25 Capsule]  


 


Alprazolam [Xanax] 0.5 mg PO DAILY 08/28/17


 


Amitriptyline HCl [Elavil] 50 mg PO DAILY 08/28/17


 


Atenolol [Tenormin] 25 mg PO DAILY 08/28/17














Progress





- Progress


[facility] 





[facility]





Care transferred to Sharla Doshi at 1900.





- EKG/XRAY/CT


EKG: rhythm (paced rhythm HR=82.)





<Hany Salazar - Last Filed: 08/31/17 19:35>





- Progress


Progress: 














Examination: Portable chest 





History: Chest discomfort 





Comparison exam: 28 August 2017 





Findings: Single view of the chest demonstrates a decreased inspiratory effort. 

Cardiac silhouette not enlarged. Tortuosity of the thoracic aorta. Left-sided 

cardiac pacemaker. Lung fields without focal infiltrate. No effusion. Osseous 

structures are appropriate for age. 





Impression: Poor inspiratory effort. No acute pulmonary process. No effusion.





 








Electronically signed on Aug 31, 2017 7:14:54 PM CDT by:





Sid Lux





<SHARLA DOSHI - Last Filed: 08/31/17 20:12>





ED Results Lab/Radiology





- Lab Results


Lab Results: 





 Lab Results











  08/31/17 08/31/17





  19:05 19:05


 


WBC    9.20 K/ul K/ul





    (4.00-12.00) 


 


RBC    4.29 M/ul M/ul





    (3.90-5.20) 


 


Hgb    13.2 g/dL g/dL





    (12.0-16.0) 


 


Hct    38.5 % %





    (34.5-46.5) 


 


MCV    89.6 fl fl





    (80.0-100.0) 


 


MCH    30.7 pg pg





    (28.0-34.0) 


 


MCHC    34.2 g/dL g/dL





    (30.0-36.0) 


 


RDW    12.2 % %





    (11.3-14.3) 


 


Plt Count    404 K/mm3 H K/mm3





    (130-400) 


 


Neut % (Auto)    61.1 % %





    (39.0-79.0) 


 


Lymph % (Auto)    24.7 % %





    (16.0-50.0) 


 


Mono % (Auto)    7.9 % %





    (0.0-11.0) 


 


Eos % (Auto)    4.0 % %





    (0.0-6.8) 


 


Baso % (Auto)    0.6 





    (0.0-1.5) 


 


Neut # (Auto)    5.6 # k/uL # k/uL





    (1.4-7.7) 


 


Lymph # (Auto)    2.3 # k/uL # k/uL





    (0.6-4.0) 


 


Mono # (Auto)    0.7 # k/uL # k/uL





    (0.0-0.9) 


 


Eos # (Auto)    0.4 # k/uL # k/uL





    (0.0-0.6) 


 


Baso # (Auto)    0.0 # k/uL # k/uL





    (0.0-0.5) 


 


Reactive Lymphs %    1.7 % %





    (0.0-5.0) 


 


Reactive Lymphs #    0.2 # k/uL # k/uL





    (0.0-0.8) 


 


CK-MB (CK-2)  0.9 ng/mL ng/mL  





   (0.0-5.6)  


 


Troponin I  < 0.03 ng/mL L ng/mL  





   (0.03-0.06)  














- Orders


Orders: 





 ED Orders











 Category Date Time Status


 


 CHEST 1 VIEW [RAD] Stat Exams  08/31/17 18:53 Taken


 


 CBC/PLATELET/DIFF Routine Lab  08/31/17 19:05 Completed


 


 CKMB Stat Lab  08/31/17 19:05 Completed


 


 CMP Routine Lab  08/31/17 19:05 Received


 


 CREATINE KINASE Routine Lab  08/31/17 19:05 Received


 


 TROPONIN I (cTnI) Stat Lab  08/31/17 19:05 Completed


 


 Diazepam [Valium] Med  08/31/17 18:49 Discontinued





 5 mg IVP NOW ONE   


 


 Ipratropium/Albuterol Sulfate [Duoneb] Med  08/31/17 18:48 Discontinued





 3 ml NEB .STK-MED ONE   


 


 Ipratropium/Albuterol Sulfate [Duoneb] Med  08/31/17 18:52 Discontinued





 3 ml NEB NOW ONE   


 


 Oxygen Daily Oxygen  08/31/17 19:00 Ordered


 


 EKG WITH COMPARISON Stat Ther  08/31/17 18:53 Ordered














<SHARLA DOSHI - Last Filed: 08/31/17 20:12>





General Adult Physical Exam





- PHYSICAL EXAM


GENERAL APPEARANCE: moderate distress (anxious)


EENT: pharyngeal erythema, dry mucous membranes


NECK: normal inspection, supple, other (b/l neck tenderness to palpation)


RESPIRATORY: wheezes


CVS: reg rate & rhythm, heart sounds normal


ABDOMEN: soft, no organomegaly, normal bowel sounds


BACK: normal inspection, no CVA tenderness


SKIN: warm/dry, normal color


EXTREMITIES: non-tender, normal range of motion


NEURO: oriented X3, motor nml, sensation nml, other (anxious)





<Hany Salazar - Last Filed: 08/31/17 19:35>





Discharge





<Hany Salazar - Last Filed: 08/31/17 19:35>


Decision to Admit: NO


Decision Time: 20:09





<SHARLA DOSHI - Last Filed: 08/31/17 20:12>


Clincal Impression: 


 Anxiety, Sore throat





Referrals: 


Primary Doctor,No [Primary Care Provider] - 2 Days


Additional Instructions: 


Follow up with your provider as needed. 


Home Medications: 


Ambulatory Orders





Alprazolam [XANAX] 0.25 mg PO HS 01/08/17 


Citalopram Hydrobromide [Citalopram HBr] 20 mg PO DAILY 01/08/17 


Glucosamine HCl/Chondr Venegas A Na [Pv Glucosamine-Chondroit Cplt] 1 each PO DAILY 

01/08/17 


Meloxicam [Mobic] 15 mg PO DAILY 01/08/17 


Omeprazole [Omeprazole] 20 mg PO DAILY 01/08/17 


Triamterene/Hydrochlorothiazid [Dyazide 37.5-25 Capsule] 1 each PO DAILY 01/08/ 17 


Alprazolam [Xanax] 0.5 mg PO DAILY 08/28/17 


Amitriptyline HCl [Elavil] 50 mg PO DAILY 08/28/17 


Atenolol [Tenormin] 25 mg PO DAILY 08/28/17 








Condition: Good


Disposition: 01 HOME, SELF-CARE

## 2018-10-25 ENCOUNTER — HOSPITAL ENCOUNTER (EMERGENCY)
Dept: HOSPITAL 44 - ED | Age: 68
Discharge: HOME | End: 2018-10-25
Payer: COMMERCIAL

## 2018-10-25 VITALS — SYSTOLIC BLOOD PRESSURE: 132 MMHG | DIASTOLIC BLOOD PRESSURE: 52 MMHG

## 2018-10-25 DIAGNOSIS — Z95.0: ICD-10-CM

## 2018-10-25 DIAGNOSIS — I51.7: ICD-10-CM

## 2018-10-25 DIAGNOSIS — J40: Primary | ICD-10-CM

## 2018-10-25 LAB
APPEARANCE UR: CLEAR
BASOPHILS NFR BLD: 0.4 % (ref 0–1.5)
COLOR,URINE: YELLOW
EGFR (NON-AFRICAN): > 60
EOSINOPHIL NFR BLD: 7.5 % (ref 0–6.8)
MCH RBC QN AUTO: 30.5 PG (ref 28–34)
MCV RBC AUTO: 92 FL (ref 80–100)
MONOCYTES %: 10.4 % (ref 0–11)
NEUTROPHILS #: 6 # K/UL (ref 1.4–7.7)
PH UR STRIP: 5.5 [PH] (ref 5–8)
RBC UR QL: NEGATIVE
UROBILINOGEN URINE: 1 EU (ref 0.2–1)

## 2018-10-25 PROCEDURE — S1016 NON-PVC INTRAVENOUS ADMINIST: HCPCS

## 2018-10-25 PROCEDURE — 87040 BLOOD CULTURE FOR BACTERIA: CPT

## 2018-10-25 PROCEDURE — 80053 COMPREHEN METABOLIC PANEL: CPT

## 2018-10-25 PROCEDURE — 87400 INFLUENZA A/B EACH AG IA: CPT

## 2018-10-25 PROCEDURE — 71046 X-RAY EXAM CHEST 2 VIEWS: CPT

## 2018-10-25 PROCEDURE — 85025 COMPLETE CBC W/AUTO DIFF WBC: CPT

## 2018-10-25 PROCEDURE — 81002 URINALYSIS NONAUTO W/O SCOPE: CPT

## 2018-10-25 PROCEDURE — 99284 EMERGENCY DEPT VISIT MOD MDM: CPT

## 2018-10-25 PROCEDURE — 87086 URINE CULTURE/COLONY COUNT: CPT

## 2018-10-25 RX ADMIN — AZITHROMYCIN ONE MG: 250 TABLET, FILM COATED ORAL at 01:24

## 2018-10-25 RX ADMIN — GUAIFENESIN AND CODEINE PHOSPHATE ONE ML: 10; 100 LIQUID ORAL at 01:25

## 2018-10-25 NOTE — DIAGNOSTIC IMAGING REPORT
BANG DOSHI 

[facility]

[address] [facility].[address] [address]

 

 

 

 

Report Submission Date: Oct 25, 2018 1:18:12 AM CDT

Patient       Study

Name:   LIZETTE PEREZ       Date:   Oct 25, 2018 12:56:34 AM CDT

MRN:   [id number]       Modality Type:   DX

Gender:   F       Description:   CHEST

:   48       Institution:   [facility]

Physician:   BANG DOSHI

     Accession:    [id number]

 

 

PA and lateral chest 

Clinical history:  Recent pneumonia.  Productive cough.  Shortness of breath. 

Findings:  Examination of the chest in PA and lateral views with no prior films 
for comparison demonstrates cardiomegaly and aortic atherosclerosis.  Bipolar 
pacemaker overlies left hemithorax.  Monitor leads superimpose the chest. 

Impression: 

1. Cardiomegaly and aortic atherosclerosis. 

2. Transvenous pacemaker.

 

Electronically signed on Oct 25, 2018 1:18:12 AM CDT by:

Erick FERNANDO

## 2018-10-25 NOTE — ED PHYSICIAN DOCUMENTATION
General Adult





- HISTORIAN


Historian: patient





- HPI


Stated Complaint: Cough/Body Aches


Chief Complaint: General Adult


Additional Information: 





Cough worse since 10/19. Also has body and joint aches, red bumps on lower legs 

for 2 days, feverish, night sweats x 2 weeks. Hospitalized at [facility] 10/4 with 

pneumonia and stayed for days. Her primary then put her on 10 day course of 

Levaquin and tapering prednisone both of which were completed 10/18. She has 

been coughing since, but worse the last six days. Has yeast infection under 

right breast and pannus. Has pacemaker 2/2 heart block. Flu shot last year. 





- ROS


CONST: fever, sweating





- PAST HX


Past History: other (heart block)


Surgeries/Procedures: cholecystectomy, other (defibrillator)


Allergies/Adverse Reactions: 


                                    Allergies











Allergy/AdvReac Type Severity Reaction Status Date / Time


 


No Known Allergies Allergy   Verified 10/25/18 00:25














Home Medications: 


                                Ambulatory Orders











 Medication  Instructions  Recorded


 


Citalopram Hydrobromide 20 mg PO DAILY 17





[Citalopram HBr]  


 


Glucosamine/Chondr Venegas A Sod [Pv 1 each PO BID 17





Glucosamine-Chondroit Cplt]  


 


Meloxicam [Mobic] 15 mg PO DAILY 17


 


Omeprazole 20 mg PO DAILY 17


 


Triamterene/Hydrochlorothiazid 1 each PO DAILY 17





[Dyazide 37.5-25 Capsule]  


 


Alprazolam [Xanax] 0.5 mg PO HS 17


 


Amitriptyline HCl [Elavil] 50 mg PO HS 17


 


Azithromycin [Zithromax] 250 mg PO DAILY #4 tablet 10/25/18


 


Codeine Phosphate/Guaifenesin 10 ml PO Q6H PRN #120 ml 10/25/18





[Robitussin AC]  


 


Metoprolol Succinate [Toprol Xl] 25 mg PO DAILY 10/25/18














- SOCIAL HX


Smoking History: non-smoker





- FAMILY HX


Family History: No





- VITAL SIGNS


Vital Signs: 





                                   Vital Signs











Temp Pulse Resp BP Pulse Ox


 


 98.9 F   85   24   125/56   95 


 


 10/25/18 00:05  10/25/18 00:05  10/25/18 00:05  10/25/18 00:05  10/25/18 00:05














- REVIEWED ASSESSMENTS


Nursing Assessment  Reviewed: Yes


Vitals Reviewed: Yes





Progress





- Progress


Progress: 





Report Submission Date: Oct 25, 2018 1:18:12 AM CDT


Patient       Study


Name:   LIZETTE PEREZ       Date:   Oct 25, 2018 12:56:34 AM CDT


MRN:   [id number]       Modality Type:   DX


Gender:   F       Description:   CHEST


:   48       Institution:   [facility]


Physician:   BANG DOSHI - ER


     Accession:    [id number]


 


 


PA and lateral chest 


Clinical history:  Recent pneumonia.  Productive cough.  Shortness of breath. 


Findings:  Examination of the chest in PA and lateral views with no prior films 

for comparison demonstrates cardiomegaly and aortic atherosclerosis.  Bipolar 

pacemaker overlies left hemithorax.  Monitor leads superimpose the chest. 


Impression: 


1. Cardiomegaly and aortic atherosclerosis. 


2. Transvenous pacemaker.


 


Electronically signed on Oct 25, 2018 1:18:12 AM CDT by:


Erick Grant








Given script for Deedee AC. 





ED Results Lab/Radiology





- Orders


Orders: 





                                    ED Orders











 Category Date Time Status


 


 Continuous EKG monitoring Q1H Care  10/25/18 00:38 Ordered


 


 CHEST 2VIEW [RAD] Stat Exams  10/25/18 Ordered


 


 BLOOD CULTURE Stat Lab  10/25/18 Ordered


 


 CBC/PLATELET/DIFF Routine Lab  10/25/18 Ordered


 


 CMP Routine Lab  10/25/18 Ordered


 


 INFLUENZA A&B Stat Lab  10/25/18 Uncollected


 


 URINALYSIS Routine Lab  10/25/18 Ordered


 


 EKG WITH COMPARISON Stat Ther  10/25/18 Ordered














General Adult Physical Exam





- PHYSICAL EXAM


GENERAL APPEARANCE: obese


EENT: eye inspection normal, ENT inspection normal (except dry), pharynx normal,

 TM's nml


NECK: normal inspection, supple


RESPIRATORY: breath sounds normal (dry cough, occasional and with deep breath. 

pulse ox 92+ on ambient air)


CVS: reg rate & rhythm, heart sounds normal


ABDOMEN: soft, normal bowel sounds


BACK: normal inspection, no CVA tenderness, other (no vertebral tenderness. 

Traps tender to even light palpation)


SKIN: warm/dry, normal color, other (Scattered tiny pink papules scattered under

 right breast and mid distal chest and more densely under pannus. 1 cm nodules 

with surrounding erythema scattered on distal 1/3 lower legs.  )


EXTREMITIES: non-tender, normal range of motion (gait and stance)


NEURO: CN's nml as tested, motor nml, sensation nml, cognition normal





Discharge


Clincal Impression: 


 Bronchitis





Referrals: 


Primary Doctor,No [Primary Care Provider] - 2 Days


Condition: Fair


Disposition: 01 HOME, SELF-CARE


Decision to Admit: NO


Decision Time: 01:35

## 2019-06-24 ENCOUNTER — HOSPITAL ENCOUNTER (EMERGENCY)
Dept: HOSPITAL 44 - ED | Age: 69
Discharge: HOME | End: 2019-06-24
Payer: COMMERCIAL

## 2019-06-24 VITALS — DIASTOLIC BLOOD PRESSURE: 48 MMHG | SYSTOLIC BLOOD PRESSURE: 123 MMHG

## 2019-06-24 DIAGNOSIS — M79.671: Primary | ICD-10-CM

## 2019-06-24 DIAGNOSIS — M79.672: ICD-10-CM

## 2019-06-24 PROCEDURE — 99283 EMERGENCY DEPT VISIT LOW MDM: CPT

## 2019-06-24 PROCEDURE — 73620 X-RAY EXAM OF FOOT: CPT

## 2019-06-24 PROCEDURE — 36415 COLL VENOUS BLD VENIPUNCTURE: CPT

## 2019-06-24 PROCEDURE — 96372 THER/PROPH/DIAG INJ SC/IM: CPT

## 2019-06-24 PROCEDURE — 84550 ASSAY OF BLOOD/URIC ACID: CPT

## 2019-06-24 RX ADMIN — KETOROLAC TROMETHAMINE ONE MG: 30 INJECTION, SOLUTION INTRAMUSCULAR at 13:35

## 2019-06-24 NOTE — ED PHYSICIAN DOCUMENTATION
General Adult





- HISTORIAN


Historian: patient





- HPI


Stated Complaint: bilat foot pain


Chief Complaint: General Adult


Onset: days ago


Further Comments: yes (69 year old female patient presents with pain to left 

foot pain in medial ankle area which starting one week ago and right heel that 

started Friday.  Patient reports taking percocet for the pain.  Has been wearing

her compression stockings.  States pain is worse with "touching" or changing 

pressure such as removing shoes or compression socks.  Denies trauma or fall. 

Denies history of gout.)





- ROS


CONST: no problems


EYES/ENT: none


CVS/RESP: none


GI/: none


MS/SKIN/LYMPH: none


NEURO/PSYCH: difficulty walking (relatead to pain)





- PAST HX


Past History: hypertension, other (GERD, depression)


Allergies/Adverse Reactions: 


                                    Allergies











Allergy/AdvReac Type Severity Reaction Status Date / Time


 


No Known Allergies Allergy   Verified 10/25/18 00:25














Home Medications: 


                                Ambulatory Orders











 Medication  Instructions  Recorded


 


Citalopram Hydrobromide 20 mg PO DAILY 01/08/17





[Citalopram HBr]  


 


Glucosamine/Chondr Venegas A Sod [Pv 1 each PO BID 01/08/17





Glucosamine-Chondroit Cplt]  


 


Meloxicam [Mobic] 15 mg PO DAILY 01/08/17


 


Omeprazole 20 mg PO DAILY 01/08/17


 


Triamterene/Hydrochlorothiazid 1 each PO DAILY 01/08/17





[Dyazide 37.5-25 Capsule]  


 


Alprazolam [Xanax] 0.5 mg PO HS 08/28/17


 


Amitriptyline HCl [Elavil] 50 mg PO HS 08/28/17


 


Metoprolol Succinate [Toprol Xl] 25 mg PO DAILY 10/25/18


 


oxyCODONE HCL/ACETAMINOPHEN 1 tab PO BID PRN 06/24/19





[Percocet 5/325]  














- SOCIAL HX


Smoking History: denies: non-smoker





- FAMILY HX


Family History: No





- VITAL SIGNS


Vital Signs: 





                                   Vital Signs











Temp Pulse Resp BP Pulse Ox


 


 98 F   72   16   140/72   95 


 


 06/24/19 12:56  06/24/19 12:56  06/24/19 12:56  06/24/19 12:56  06/24/19 12:56














- REVIEWED ASSESSMENTS


Nursing Assessment  Reviewed: Yes


Vitals Reviewed: Yes





Progress





- Progress


Progress: 





Patient did not take her mobic today





1530 Patient states she feels better after the toradol.  Will place on medrol 

dose pack. Reviewed discharge instructions, encouraged follow up with podiatry. 

Patient verbalized understanding. 





ED Results Lab/Radiology





- Radiology


Radiology Impressions: 





Exam: Bilateral feet.   





History: Pain.   





AP, lateral and oblique views of both feet are submitted.   





Diffuse osteopenia is noted.  Degenerate changes at the interphalangeal joints 

are noted.  Marked degenerative changes at the 1st metatarsophalangeal joint is 

noted.  Spurs are noted to extend off the plantar and posterior surfaces of the 

heels bilaterally.  No other soft tissue abnormality is identified.   





Impression: 


Degenerate changes.   


Heel spurs.


 


Electronically signed on Jun 24, 2019 2:56:31 PM CDT by:


Paul Benitez





- Orders


Orders: 





                                    ED Orders











 Category Date Time Status


 


 BILAT FEET 2 VIEW [RAD] Stat Exams  06/24/19 Ordered


 


 URIC ACID Stat Lab  06/24/19 Ordered


 


 Ketorolac Tromethamine [Toradol] Med  06/24/19 13:23 Once





 60 mg IM NOW ONE   














General Adult Physical Exam





- PHYSICAL EXAM


GENERAL APPEARANCE: mild distress


EENT: eye inspection normal, GLORIA


RESPIRATORY: no resp distress


CVS: reg rate & rhythm


BACK: normal inspection, no CVA tenderness


SKIN: normal color, warm/dry, NR, INT, PAL, DR


EXTREMITIES: non-tender, normal range of motion, no evidence of injury, no 

edema, other (bilateral feet examine: right foot with mild erythema in heel 

area, tender to mild palpation; left foot with no erythema, tenderness to medial

 ankle area.)


NEURO: oriented X3, motor nml, sensation nml, mood/affect nml





Discharge


Clincal Impression: 


 Heel pain, bilateral





Referrals: 


Benedicto Loo MD [Primary Care Provider] - 2 Days


Additional Instructions: 


Rest


Wear supportive shoes


 your prescription and start it today


Restart your Mobic tomorrow. 





You can add Tylenol arthritis as needed for discomfort.  Limit your total 

acetaminophen intake to 4G in 24 hours. 


Disposition: 01 HOME, SELF-CARE


Decision to Admit: NO


Decision Time: 15:20

## 2019-06-24 NOTE — DIAGNOSTIC IMAGING REPORT
CONCHITA HODGE (NP) - ER 



[facility]



[address] [facility].[address] [address]



 



 



 



 



Report Submission Date: 2019 2:56:31 PM CDT



Patient   Study 

Name: LIZETTE PEREZ   Date: 2019 1:35:31 PM CDT 

MRN: [id number]   Modality Type: DX 

Gender: F   Description: BILAT FEET 2 VIEW 

: 48   Institution: [facility] 

Physician: CONCHITA HODGE (PEMA) - ER

    Accession:  [id number] 



 



 





Exam: Bilateral feet.   



History: Pain.   



AP, lateral and oblique views of both feet are submitted.   



Diffuse osteopenia is noted.  Degenerate changes at the interphalangeal joints 
are noted.  Marked degenerative changes at the 1st metatarsophalangeal joint is 
noted.  Spurs are noted to extend off the plantar and posterior surfaces of the 
heels bilaterally.  No other soft tissue abnormality is identified.   



Impression: 

Degenerate changes.   

Heel spurs.



 





Electronically signed on 2019 2:56:31 PM CDT by:



Paul FERNANDO